# Patient Record
Sex: MALE | Race: WHITE | HISPANIC OR LATINO | Employment: UNEMPLOYED | URBAN - METROPOLITAN AREA
[De-identification: names, ages, dates, MRNs, and addresses within clinical notes are randomized per-mention and may not be internally consistent; named-entity substitution may affect disease eponyms.]

---

## 2017-10-24 ENCOUNTER — HOSPITAL ENCOUNTER (EMERGENCY)
Facility: HOSPITAL | Age: 32
Discharge: HOME | End: 2017-10-24
Attending: EMERGENCY MEDICINE | Admitting: EMERGENCY MEDICINE

## 2017-10-24 ENCOUNTER — APPOINTMENT (OUTPATIENT)
Dept: ULTRASOUND IMAGING | Facility: HOSPITAL | Age: 32
End: 2017-10-24

## 2017-10-24 VITALS
TEMPERATURE: 98 F | DIASTOLIC BLOOD PRESSURE: 71 MMHG | RESPIRATION RATE: 16 BRPM | BODY MASS INDEX: 32.78 KG/M2 | SYSTOLIC BLOOD PRESSURE: 112 MMHG | OXYGEN SATURATION: 98 % | HEIGHT: 63 IN | HEART RATE: 65 BPM | WEIGHT: 185 LBS

## 2017-10-24 DIAGNOSIS — N43.3 HYDROCELE IN ADULT: ICD-10-CM

## 2017-10-24 DIAGNOSIS — N43.40 SPERMATOCELE OF EPIDIDYMIS: Primary | ICD-10-CM

## 2017-10-24 DIAGNOSIS — N50.811 TESTICULAR PAIN, RIGHT: ICD-10-CM

## 2017-10-24 LAB
BILIRUB UR QL STRIP: NEGATIVE
CLARITY UR: CLEAR
COLOR UR: YELLOW
GLUCOSE UR STRIP-MCNC: NEGATIVE MG/DL
HGB UR QL STRIP.AUTO: NEGATIVE
KETONES UR QL STRIP: NEGATIVE
LEUKOCYTE ESTERASE UR QL STRIP.AUTO: NEGATIVE
NITRITE UR QL STRIP: NEGATIVE
PH UR STRIP.AUTO: 6 [PH] (ref 5–8)
PROT UR QL STRIP: NEGATIVE
SP GR UR STRIP: 1.01 (ref 1–1.03)
UROBILINOGEN UR QL STRIP: NORMAL

## 2017-10-24 PROCEDURE — 81003 URINALYSIS AUTO W/O SCOPE: CPT | Performed by: NURSE PRACTITIONER

## 2017-10-24 PROCEDURE — 87591 N.GONORRHOEAE DNA AMP PROB: CPT | Performed by: NURSE PRACTITIONER

## 2017-10-24 PROCEDURE — 87491 CHLMYD TRACH DNA AMP PROBE: CPT | Performed by: NURSE PRACTITIONER

## 2017-10-24 PROCEDURE — 99283 EMERGENCY DEPT VISIT LOW MDM: CPT

## 2017-10-24 PROCEDURE — 76870 US EXAM SCROTUM: CPT

## 2017-10-24 RX ORDER — PHENAZOPYRIDINE HYDROCHLORIDE 200 MG/1
200 TABLET, FILM COATED ORAL 3 TIMES DAILY PRN
Qty: 9 TABLET | Refills: 0 | OUTPATIENT
Start: 2017-10-24 | End: 2019-04-08 | Stop reason: HOSPADM

## 2017-10-24 RX ORDER — DICLOFENAC SODIUM 75 MG/1
75 TABLET, DELAYED RELEASE ORAL 2 TIMES DAILY
Qty: 20 TABLET | Refills: 0 | OUTPATIENT
Start: 2017-10-24 | End: 2019-04-08 | Stop reason: HOSPADM

## 2017-10-24 NOTE — ED PROVIDER NOTES
Subjective   Patient is a 32 y.o. male presenting with male genitourinary complaint.   History provided by:  Patient and significant other  Male  Problem   Presenting symptoms: dysuria, scrotal pain and swelling    Presenting symptoms: no penile discharge and no penile pain    Dysuria:     Severity:  Moderate    Onset quality:  Sudden    Duration:  4 days    Timing:  Constant    Progression:  Unchanged    Chronicity:  New  Scrotal pain:     Affected testicle:  Right    Severity:  Moderate    Onset quality:  Sudden    Duration:  4 days    Timing:  Constant    Progression:  Unchanged  Swelling:     Location:  Genitalia (Right scrotum)    Onset quality:  Gradual    Duration:  1 day    Timing:  Constant    Progression:  Unchanged    Chronicity:  New  Context comment:  Pain during urination and shortly after urinating.  Relieved by:  Nothing  Worsened by:  Tactile pressure and urination (Painful erections)  Associated symptoms: scrotal swelling    Associated symptoms: no abdominal pain, no diarrhea, no fever, no flank pain, no genital rash, no groin pain, no hematuria, no nausea, no penile redness, no penile swelling, no urinary frequency, no urinary incontinence, no urinary retention and no vomiting    Risk factors: no new sexual partner    Risk factors comment:  Denies STD history      Review of Systems   Constitutional: Negative for chills and fever.   Gastrointestinal: Negative for abdominal pain, diarrhea, nausea, rectal pain and vomiting.   Genitourinary: Positive for dysuria, scrotal swelling and testicular pain (Right-sided testicular pain). Negative for bladder incontinence, discharge, flank pain, frequency, hematuria, penile pain and penile swelling.   All other systems reviewed and are negative.      History reviewed. No pertinent past medical history.    No Known Allergies    History reviewed. No pertinent surgical history.    History reviewed. No pertinent family history.    Social History     Social  History   • Marital status:      Spouse name: N/A   • Number of children: N/A   • Years of education: N/A     Social History Main Topics   • Smoking status: Never Smoker   • Smokeless tobacco: None      Comment: SOCIALLY, ONCE A MONTH   • Alcohol use No   • Drug use: No   • Sexual activity: Not Asked     Other Topics Concern   • None     Social History Narrative   • None           Objective   Physical Exam   Constitutional: He is oriented to person, place, and time. He appears well-developed and well-nourished.   HENT:   Right Ear: External ear normal.   Left Ear: External ear normal.   Cardiovascular: Normal rate, regular rhythm and normal heart sounds.    Pulmonary/Chest: Effort normal and breath sounds normal.   Abdominal: Soft. Bowel sounds are normal. He exhibits no distension. There is no tenderness. There is no guarding. Hernia confirmed negative in the right inguinal area and confirmed negative in the left inguinal area.   Genitourinary: Penis normal. Right testis shows swelling and tenderness. Left testis shows no swelling and no tenderness. Circumcised.   Lymphadenopathy: No inguinal adenopathy noted on the right or left side.   Neurological: He is alert and oriented to person, place, and time.   Skin: Skin is warm and dry.   Psychiatric: He has a normal mood and affect. His behavior is normal.       Procedures         ED Course  ED Course      Recent Results (from the past 24 hour(s))   Urinalysis With / Culture If Indicated - Urine, Clean Catch    Collection Time: 10/24/17 11:59 AM   Result Value Ref Range    Color, UA Yellow Yellow, Straw    Appearance, UA Clear Clear    pH, UA 6.0 5.0 - 8.0    Specific Gravity, UA 1.012 1.001 - 1.030    Glucose, UA Negative Negative    Ketones, UA Negative Negative    Bilirubin, UA Negative Negative    Blood, UA Negative Negative    Protein, UA Negative Negative    Leuk Esterase, UA Negative Negative    Nitrite, UA Negative Negative    Urobilinogen, UA 1.0  "E.U./dL 0.2 - 1.0 E.U./dL     Note: In addition to lab results from this visit, the labs listed above may include labs taken at another facility or during a different encounter within the last 24 hours. Please correlate lab times with ED admission and discharge times for further clarification of the services performed during this visit.    US Scrotum & Testicles   Preliminary Result   1. Right and left testicles appear within normal limits.   2. Small (3 mm) right spermatocele.   3. Small debris-containing hydroceles bilaterally.            D:  10/24/2017   E:  10/24/2017            Vitals:    10/24/17 1156   BP: 137/77   BP Location: Right arm   Patient Position: Sitting   Pulse: 66   Resp: 16   Temp: 98 °F (36.7 °C)   TempSrc: Oral   SpO2: 99%   Weight: 185 lb (83.9 kg)   Height: 63\" (160 cm)     Medications - No data to display  ECG/EMG Results (last 24 hours)     ** No results found for the last 24 hours. **        Discussed urinalysis and ultrasound results with patient and significant other.  We'll have him follow-up with urology in the next 2-3 days.  I'm sending him home with diclofenac and Pyridium for discomfort.  Patient instructed to drink lots of water and return to the ER with worsening of symptoms or development of new symptoms.  Patient and significant other verbalized understanding            MDM    Final diagnoses:   Spermatocele of epididymis   Testicular pain, right   Hydrocele in adult            Jennifer Matheus, APRN  10/24/17 1407    "

## 2017-10-24 NOTE — DISCHARGE INSTRUCTIONS
Drink plenty of fluid and take medication as prescribed. We will contact you at home with any positive urine culture results. Follow up with Dr. Saravia in 2-3 days or return to ED with worsening of symptoms of development of new symptoms.

## 2017-10-26 LAB
C TRACH RRNA SPEC DONR QL NAA+PROBE: NEGATIVE
N GONORRHOEA DNA SPEC QL NAA+PROBE: NEGATIVE

## 2019-04-08 ENCOUNTER — APPOINTMENT (OUTPATIENT)
Dept: GENERAL RADIOLOGY | Facility: HOSPITAL | Age: 34
End: 2019-04-08

## 2019-04-08 ENCOUNTER — HOSPITAL ENCOUNTER (EMERGENCY)
Facility: HOSPITAL | Age: 34
Discharge: HOME | End: 2019-04-08
Attending: EMERGENCY MEDICINE | Admitting: EMERGENCY MEDICINE

## 2019-04-08 VITALS
HEART RATE: 64 BPM | SYSTOLIC BLOOD PRESSURE: 140 MMHG | RESPIRATION RATE: 18 BRPM | DIASTOLIC BLOOD PRESSURE: 80 MMHG | OXYGEN SATURATION: 98 % | BODY MASS INDEX: 27.6 KG/M2 | WEIGHT: 150 LBS | HEIGHT: 62 IN | TEMPERATURE: 98 F

## 2019-04-08 DIAGNOSIS — S92.132A CLOSED DISPLACED FRACTURE OF POSTERIOR PROCESS OF LEFT TALUS, INITIAL ENCOUNTER: Primary | ICD-10-CM

## 2019-04-08 PROCEDURE — 99283 EMERGENCY DEPT VISIT LOW MDM: CPT

## 2019-04-08 PROCEDURE — 73610 X-RAY EXAM OF ANKLE: CPT

## 2019-04-08 NOTE — ED PROVIDER NOTES
"Jose Schulte is a 34 y.o.male who presents to the ED with complaints of left ankle pain. The patient reports his pain has been constant since he heard a \"pop\" when he stood up five days ago. He has not taken any medication for his pain. Additionally, he has been ambulatory with pain since the incident. There are no other complaints at this time.         History provided by:  Patient and friend  Lower Extremity Issue   Location:  Ankle  Time since incident:  5 days  Injury: yes    Mechanism of injury comment:  [place] a \"pop\" when he stood up  Ankle location:  L ankle  Pain details:     Quality:  Aching    Radiates to:  Does not radiate    Severity:  Moderate (7/10)    Onset quality:  Sudden    Duration:  5 days    Timing:  Constant    Progression:  Unchanged  Chronicity:  New  Dislocation: no    Foreign body present:  No foreign bodies  Prior injury to area:  No  Relieved by:  None tried  Worsened by:  Nothing  Ineffective treatments:  None tried  Associated symptoms: swelling        Review of Systems   Musculoskeletal: Positive for arthralgias (left ankle) and joint swelling (left ankle).   All other systems reviewed and are negative.      History reviewed. No pertinent past medical history.    No Known Allergies    History reviewed. No pertinent surgical history.    History reviewed. No pertinent family history.    Social History     Socioeconomic History   • Marital status:      Spouse name: Not on file   • Number of children: Not on file   • Years of education: Not on file   • Highest education level: Not on file   Tobacco Use   • Smoking status: Never Smoker   • Tobacco comment: SOCIALLY, ONCE A MONTH   Substance and Sexual Activity   • Alcohol use: No   • Drug use: No         Objective   Physical Exam   Constitutional: He is oriented to person, place, and time. He appears well-developed and well-nourished. No distress.   HENT:   Head: Normocephalic and atraumatic.   Nose: Nose normal. "   Eyes: Conjunctivae are normal.   Neck: Normal range of motion. Neck supple.   Cardiovascular: Normal rate, regular rhythm and intact distal pulses.   Pulmonary/Chest: Effort normal and breath sounds normal. No respiratory distress.   Abdominal: Soft. Bowel sounds are normal. There is no tenderness.   Musculoskeletal: He exhibits no edema.        Left foot: There is tenderness, bony tenderness and swelling. There is normal range of motion.   Neurological: He is alert and oriented to person, place, and time.   Skin: Skin is warm and dry.   Psychiatric: He has a normal mood and affect. His behavior is normal.   Nursing note and vitals reviewed.      Procedures         ED Course  ED Course as of Apr 08 1733   Mon Apr 08, 2019   1307 Patient is advised of results at this time.  The patient will be placed in a posterior left lower extremity splint.  Patient will be given crutches.  Patient declined pain medication.  Patient advised that he needs to take Tylenol and ibuprofen as discussed.  Patient agrees and verbalized understanding.  [KG]      ED Course User Index  [KG] Bre Bear, APRN     No results found for this or any previous visit (from the past 24 hour(s)).  Note: In addition to lab results from this visit, the labs listed above may include labs taken at another facility or during a different encounter within the last 24 hours. Please correlate lab times with ED admission and discharge times for further clarification of the services performed during this visit.    XR Ankle 3+ View Left   Final Result   Small fracture seen of the posterior aspect of the talus.   Small joint effusion present.       D:  04/08/2019   E:  04/08/2019       This report was finalized on 4/8/2019 1:47 PM by Dr. Aminta Celestin MD.            Vitals:    04/08/19 1115 04/08/19 1117   BP:  140/80   BP Location:  Left arm   Patient Position:  Sitting   Pulse:  64   Resp:  18   Temp:  98 °F (36.7 °C)   TempSrc:  Oral   SpO2:   "98%   Weight: 68 kg (150 lb)    Height: 157.5 cm (62\")      Medications - No data to display  ECG/EMG Results (last 24 hours)     ** No results found for the last 24 hours. **        No orders to display                       MDM    Final diagnoses:   Closed displaced fracture of posterior process of left talus, initial encounter       Documentation assistance provided by neetu Barber.  Information recorded by the scribe was done at my direction and has been verified and validated by me.     Omer Barber  04/08/19 1203       Bre Bear, SERGIO  04/08/19 6904    "